# Patient Record
Sex: FEMALE | Race: WHITE
[De-identification: names, ages, dates, MRNs, and addresses within clinical notes are randomized per-mention and may not be internally consistent; named-entity substitution may affect disease eponyms.]

---

## 2019-07-19 ENCOUNTER — HOSPITAL ENCOUNTER (EMERGENCY)
Dept: HOSPITAL 96 - M.ERS | Age: 74
Discharge: HOME | End: 2019-07-19
Payer: COMMERCIAL

## 2019-07-19 VITALS — BODY MASS INDEX: 26.68 KG/M2 | HEIGHT: 67 IN | WEIGHT: 170 LBS

## 2019-07-19 VITALS — DIASTOLIC BLOOD PRESSURE: 87 MMHG | SYSTOLIC BLOOD PRESSURE: 128 MMHG
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
## 2019-07-19 DIAGNOSIS — S93.402A: Primary | ICD-10-CM

## 2019-07-19 DIAGNOSIS — Z88.0: ICD-10-CM
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
## 2019-09-27 ENCOUNTER — HOSPITAL ENCOUNTER (EMERGENCY)
Dept: HOSPITAL 96 - M.ERS | Age: 74
Discharge: HOME | End: 2019-09-27
Payer: COMMERCIAL

## 2019-09-27 VITALS — SYSTOLIC BLOOD PRESSURE: 147 MMHG | DIASTOLIC BLOOD PRESSURE: 74 MMHG

## 2019-09-27 VITALS — BODY MASS INDEX: 26.03 KG/M2 | WEIGHT: 162 LBS | HEIGHT: 66 IN

## 2019-09-27 DIAGNOSIS — N30.10: ICD-10-CM

## 2019-09-27 DIAGNOSIS — Z88.8: ICD-10-CM

## 2019-09-27 DIAGNOSIS — R25.2: Primary | ICD-10-CM

## 2019-09-27 DIAGNOSIS — Z88.0: ICD-10-CM

## 2019-09-27 DIAGNOSIS — Z90.710: ICD-10-CM

## 2019-09-27 DIAGNOSIS — Z90.49: ICD-10-CM

## 2019-09-27 DIAGNOSIS — Z88.6: ICD-10-CM

## 2019-09-27 LAB
ABSOLUTE BASOPHILS: 0 THOU/UL (ref 0–0.2)
ABSOLUTE EOSINOPHILS: 0.1 THOU/UL (ref 0–0.7)
ABSOLUTE MONOCYTES: 0.5 THOU/UL (ref 0–1.2)
ALBUMIN SERPL-MCNC: 4 G/DL (ref 3.4–5)
ALP SERPL-CCNC: 102 U/L (ref 46–116)
ALT SERPL-CCNC: 23 U/L (ref 30–65)
ANION GAP SERPL CALC-SCNC: 11 MMOL/L (ref 7–16)
AST SERPL-CCNC: 16 U/L (ref 15–37)
BASOPHILS NFR BLD AUTO: 0.7 %
BILIRUB SERPL-MCNC: 0.6 MG/DL
BUN SERPL-MCNC: 39 MG/DL (ref 7–18)
CALCIUM SERPL-MCNC: 9.2 MG/DL (ref 8.5–10.1)
CHLORIDE SERPL-SCNC: 102 MMOL/L (ref 98–107)
CO2 SERPL-SCNC: 23 MMOL/L (ref 21–32)
CREAT SERPL-MCNC: 1.1 MG/DL (ref 0.6–1.3)
EOSINOPHIL NFR BLD: 1.5 %
GLUCOSE SERPL-MCNC: 119 MG/DL (ref 70–99)
GRANULOCYTES NFR BLD MANUAL: 62.5 %
HCT VFR BLD CALC: 42.1 % (ref 37–47)
HGB BLD-MCNC: 14.2 GM/DL (ref 12–15)
LYMPHOCYTES # BLD: 1.7 THOU/UL (ref 0.8–5.3)
LYMPHOCYTES NFR BLD AUTO: 27.7 %
MCH RBC QN AUTO: 32.7 PG (ref 26–34)
MCHC RBC AUTO-ENTMCNC: 33.8 G/DL (ref 28–37)
MCV RBC: 96.9 FL (ref 80–100)
MONOCYTES NFR BLD: 7.6 %
MPV: 7.2 FL. (ref 7.2–11.1)
NEUTROPHILS # BLD: 3.9 THOU/UL (ref 1.6–8.1)
NUCLEATED RBCS: 0 /100WBC
PLATELET COUNT*: 263 THOU/UL (ref 150–400)
POTASSIUM SERPL-SCNC: 3.9 MMOL/L (ref 3.5–5.1)
PROT SERPL-MCNC: 7.5 G/DL (ref 6.4–8.2)
RBC # BLD AUTO: 4.34 MIL/UL (ref 4.2–5)
RDW-CV: 13.3 % (ref 10.5–14.5)
SODIUM SERPL-SCNC: 136 MMOL/L (ref 136–145)
WBC # BLD AUTO: 6.2 THOU/UL (ref 4–11)

## 2019-09-27 NOTE — EKG
[place] [address]
Phone:  [phone]                     ELECTROCARDIOGRAM REPORT      
_______________________________________________________________________________
 
Name:       ДМИТРИЙ DE LEON JENNY              Room:                      [facility]#:  [id number]      Account #:      [id number]  
Admission:  19     Attend Phys:                         
Discharge:  19     Date of Birth:  [birth year]  
         Report #: [id number]
    [id number]
_______________________________________________________________________________
THIS REPORT FOR:  //name//                      
 
                         [facility] ED
                                       
Test Date:    2019               Test Time:    06:24:25
Pat Name:     ДМИТРИЙ DE LEON          Department:   
Patient ID:   [id number]            Room:          
Gender:       F                        Technician:   MIRI
:          1945               Requested By: Donny Tse
Order Number: [id number] MD:   Alexandre Muir
                                 Measurements
Intervals                              Axis          
Rate:         83                       P:            39
NV:           147                      QRS:          -31
QRSD:         86                       T:            28
QT:           379                                    
QTc:          446                                    
                           Interpretive Statements
Sinus rhythm
Abnormal R-wave progression, early transition
Left ventricular hypertrophy
Compared to ECG 2016 01:40:16
Sinus tachycardia no longer present
 
Electronically Signed On 2019 10:07:41 CDT by Alexandre Muir
[email or web address]
 
 
 
 
 
 
 
 
 
 
 
 
 
 
 
 
 
  <ELECTRONICALLY SIGNED>
                                           By: Alexandre Muir MD, [facility]      
  19     1007
D: 19 0624   _____________________________________
T: 19   Alexandre Muir MD, [facility]        /EPI

## 2020-12-24 NOTE — NUR
CM SPOKE TO THE PT VIA THE HOSPITAL ROOM PHONE TO DISCUSS CM ASSESSMENT AS PT
IS CURRENTLY UNDER ENHANCED PRECAUTIONS FOR COVID 19. PT A&O. PT INFORMS THAT
SHE IS NORMALLY INDEPENDENT WITH ADL'S, ACTIVE AND DRIVES. PT RESIDES AT HOME
ALONE. PT OWNS 0 DME. PT CURRENTLY ON 2L O2 AND DID NOT USE HOME O2 PRIOR TO
ADMIT. PT HAS 0 HX OF HH OR SNF. CM WILL REMAIN AVAILABLE TO ASSIST AND FOLLOW
FOR D/C PLANNING.

## 2020-12-24 NOTE — EKG
[place] [address]
Phone:  [phone]                     ELECTROCARDIOGRAM REPORT      
_______________________________________________________________________________
 
Name:         HALEYДМИТРИЙ J             Room:          [address]    ADM [place] 
.R.#:    [id number]     Account #:     [id number]  
Admission:    20    Attend Phys:   Betty Marte, 
Discharge:                Date of Birth: [birth year]  
Date of Service: 20 1712  Report #:      [id number]
        [id number]
_______________________________________________________________________________
THIS REPORT FOR:  //name//                      
 
                         [facility] ED
                                       
Test Date:    2020               Test Time:    17:12:53
Pat Name:     ДМИТРИЙ DE LEON          Department:   
Patient ID:   [id number]            Room:         [facility]
Gender:       F                        Technician:   PATTIE
:          1945               Requested By: Kelsy Carlin
Order Number: [id number] MD:   Alexandre Muir
                                 Measurements
Intervals                              Axis          
Rate:         77                       P:            36
WA:           125                      QRS:          -26
QRSD:         98                       T:            34
QT:           398                                    
QTc:          451                                    
                           Interpretive Statements
Sinus rhythm
Probable left atrial enlargement
Borderline left axis deviation
Abnormal R-wave progression, early transition
Minimal ST depression, lateral leads
Baseline wander in lead(s) V1
Compared to ECG 2019 06:24:25
ST (T wave) deviation now present
Left ventricular hypertrophy no longer present
Electronically Signed On 2020 10:41:13 CST by Alexandre Muir
[email or web address]
 
 
 
 
 
 
 
 
 
 
 
 
 
 
 
  <ELECTRONICALLY SIGNED>
                                           By: Alexandre Muir MD, [facility]      
  20     1041
D: 20   _____________________________________
T: 20   Alexandre Muir MD, [facility]        /EPI

## 2020-12-24 NOTE — NUR
Nutrition: pt admitted with COVID + status, influenza B + also. Risked for
poor intake, weight loss. Chart reveiwed. Called and s/w pt over phone.
Reports poor appetite prior to admit for a week or so. Weight loss of 7# or 4%
over the past 2 months-not significant and does report fluctuations. 2 weights
takn on admit, 168# and 188#. Pt reports 168# is accurate. Ate well this am,
> 90% of breakfast. Denies need for supplements. Continue to follow for
adequate intake trends. Currently on 4 L 02 and sounded winded speaking with
RD. Place as low nutrition risk for now.

## 2020-12-24 NOTE — NUR
PT RESTING IN BED THROUGHOUT SHIFT.PT REPOSITIONS SELF FREQUENTLY. PT UP TO
BSC AND SITS ON SIDE OF BED. O2@4L NC AS SATS DECREASED TO 80S ON 2L. IVF
INFUSING. PT TOLERATING PO WELL. SON UPDATED ON PLAN OF CARE

## 2020-12-24 NOTE — NUR
Admit at 2140. She is covid positive. She was exposed by her son who tested
positive also. She has had no appetite, diarrhea, body aches, chills and
cough. She is weak from all this going on. She has stable vitals. O2 at 2L
n/c. Up with assist to bedside commode. Barrier cream applied for redness from
loose stools. She hasn't had a stool since she's been here. Meds ordered per
Dr. Marte. She is sinus rhythym on the monitor.

## 2020-12-25 NOTE — NUR
PT CARE ASSUMED AT 1930. SAT MAINTAINED IN O2. ALERT AND ORIENTED X4. CALL
LIGHT WITHIN REACH AND BED IN LOW POSITION. HOURLY ROUNDING DONE FOR PT
SAFETY.

## 2020-12-26 NOTE — NUR
PT AO X4 LYING IN BED AT TIME OF ASSESSMENT, SHE IS NOW ON RA SATTING
APPROPRIATELY, SHE IS COUGHING MORE HARSHLY WITH SOME PRODUCTION. PT GETTING
IV ANTIBIOTICS PER MAR. SHE IS EATING MOST OF MEALS AND DENIES PAIN.

## 2020-12-27 NOTE — NUR
ASSUMED CARE OF PT AFTER REPORT AT 1930. PT A&OX4. VSS. PHYSICAL ASSESSMENT
COMPLETED AND CHARTED. PT ON RA. PT TRACING SR ON TELE. PT UPADLIB TO BSC. PT
REQUESTING MED FOR CONSTIPATION-MED GIVEN PER MAR. PT DENIES PAIN. CALL LIGHT
WITHIN REACH.

## 2020-12-27 NOTE — NUR
PT AO X4 LYING IN BED AT TIME OF ASSESSMENT. SHE IS STILL COUGHING BUT NOT
HAVING THE PRODUCTION SHE WAS LIKE BEFORE. PT IS ON ROOM AIR SATTING
APPROPRIATELY, SHE HAS BEEN GETTING IV REMDESIVIR AND AZITROMYCIN AS WELL AS
STEROIDS. SHE IS GOING TO BE DISCHARGED TODAY. SHE HAS BEEN EATING MOST OF HER
MEAL TRAY AND HAS BEEN UP TO THE TOILET INDEPENDENTLY. IV REMOVED INTACT. PT
WAS TAKEN BY WHEELCHAIR TO PRIVATE VEHICLE WHERE HER SON HELPED HER TO CAR.
PRESCRIPTIONS CALLED TO [facility] AT [facility] [address] DISCHARGE INSTRUCTIONS WERE
REVIEWED WITH NO QUESTIONS.